# Patient Record
Sex: FEMALE | Race: WHITE | Employment: FULL TIME | URBAN - METROPOLITAN AREA
[De-identification: names, ages, dates, MRNs, and addresses within clinical notes are randomized per-mention and may not be internally consistent; named-entity substitution may affect disease eponyms.]

---

## 2017-03-13 ENCOUNTER — OFFICE VISIT - HEALTHEAST (OUTPATIENT)
Dept: FAMILY MEDICINE | Facility: CLINIC | Age: 57
End: 2017-03-13

## 2017-03-13 ENCOUNTER — COMMUNICATION - HEALTHEAST (OUTPATIENT)
Dept: FAMILY MEDICINE | Facility: CLINIC | Age: 57
End: 2017-03-13

## 2017-03-13 DIAGNOSIS — J45.901 ASTHMA EXACERBATION: ICD-10-CM

## 2017-03-13 DIAGNOSIS — J01.90 ACUTE SINUSITIS: ICD-10-CM

## 2017-08-19 ENCOUNTER — HEALTH MAINTENANCE LETTER (OUTPATIENT)
Age: 57
End: 2017-08-19

## 2018-01-07 ENCOUNTER — HEALTH MAINTENANCE LETTER (OUTPATIENT)
Age: 58
End: 2018-01-07

## 2019-11-06 ENCOUNTER — HEALTH MAINTENANCE LETTER (OUTPATIENT)
Age: 59
End: 2019-11-06

## 2020-03-14 ENCOUNTER — NURSE TRIAGE (OUTPATIENT)
Dept: NURSING | Facility: CLINIC | Age: 60
End: 2020-03-14

## 2020-03-15 ENCOUNTER — VIRTUAL VISIT (OUTPATIENT)
Dept: FAMILY MEDICINE | Facility: OTHER | Age: 60
End: 2020-03-15

## 2020-03-15 NOTE — TELEPHONE ENCOUNTER
4 days ago - fever Wednesday night, woke up with chills/cold sweat, dry cough on Monday. Thursday morning 101.4. Has shortness of breath which is different from her typical asthma. Feels short of breath when speaking. Intense headache Temp earlier today was 103. Influenza test was negative 2 days ago. Per protocol, advised [email or web address] evaluation for possible covid testing.    Evita Mccartney RN on 3/14/2020 at 11:02 PM    Reason for Disposition    [1] Fever or feeling feverish AND [2] within 14 Days of CORONAVIRUS EXPOSURE    Additional Information    Negative: Severe difficulty breathing (e.g., struggling for each breath, speak in single words, bluish lips)    Negative: Sounds like a life-threatening emergency to the triager    Negative: [1] Difficulty breathing (shortness of breath) occurs AND [2] onset > 14 days after CORONAVIRUS EXPOSURE (Close Contact)    Negative: [1] Dry cough occurs AND [2] onset > 14 days after CORONAVIRUS EXPOSURE    Negative: [1] Wet cough (i.e., white-yellow, yellow, green, or kate colored sputum) AND [2] onset > 14 days after CORONAVIRUS EXPOSURE    Negative: [1] Common cold symptoms AND [2] onset > 14 days after CORONAVIRUS EXPOSURE    Negative: [1] Difficulty breathing occurs AND [2] within 14 days of CORONAVIRUS EXPOSURE    Negative: Patient sounds very sick or weak to the triager    [facility] Specific Disposition  - REQUIRED: [facility] Specific Patient Instructions  COVID 19 Nurse Triage Plan    Patient advised to stay home with mild symptoms and follow home care symptom management.     Instructions Given to Patient  It is recommended that you setup a virtual visit with one of our virtual providers.  To do this follow these instructions:    1. Go to the website [email or web address]  2. Create an account (you will need your insurance information)  3. Start a new visit  4. Choose your diagnosis (e.g. COVID19)  5. Fill out the information about your symptoms  6. A provider  will reach out to you by text, phone call or video visit based on your request    While you are at home please follow these instructions to care for yourself:    Isolate Yourself:  Isolate yourself at home.   Do Not allow any visitors  Do Not go to work or school  Do Not go to [employer],  centers, shopping, or other public places.  Do Not shake hands.  Avoid close contact with others (hugging, kissing).    Protect Others:  Cover Your Mouth and Nose with a mask, disposable tissue or wash cloth to avoid spreading germs to others.  Wash your hands and face frequently with soap and water.    Fever Medicines:  For fever relief, take acetaminophen or ibuprofen.  Treat fevers above 101  F (38.3  C) to lower fevers and make you more comfortable.   Acetaminophen (e.g., Tylenol): Take 650 mg (two 325 mg pills) by mouth every 4-6 hours as needed of regular strength Tyleno or 1,000 mg (two 500 mg pills) every 8 hours as needed of Extra Strength Tylenol.   Ibuprofen (e.g., Motrin, Advil): Take 400 mg (two 200 mg pills) by mouth every 6 hours as needed.   Acetaminophen is thought to be safer than ibuprofen or naproxen for people over 65 years old. Acetaminophen is in many OTC and prescription medicines. It might be in more than one medicine that you are taking. You need to be careful and not take an overdose. Before taking any medicine, read all the instructions on the package.  Caution -NSAIDs (e.g., ibuprofen, naproxen): Do not take nonsteroidal anti-inflammatory drugs (NSAIDs) if you have stomach problems, kidney disease, heart failure, or other contraindications to using this type of medicine. Do not take NSAID medicines for over 7 days without consulting your PCP. Do not take NSAID medicines if you are pregnant. Do not take NSAID medicines if you are also taking blood thinners.     Call Back If: Breathing difficulty develops or you become worse.    Thank you for limiting contact with others, wearing a simple mask to  cover your cough, practice good hand hygiene habits and accessing our virtual services where possible to limit the spread of this virus.    For more information about COVID19 and options for caring for yourself at home, please visit the CDC website at https://www.cdc.gov/coronavirus/2019-ncov/about/steps-when-sick.html  For more options for care at [facility], please visit our website at [email or web address]    Protocols used: CORONAVIRUS (2019-NCOV) EXPOSURE-A-AH

## 2020-03-15 NOTE — PROGRESS NOTES
"Date: 03/15/2020 00:00:19  Clinician: Lyric Christine  Clinician NPI: [id number]  Patient: Kari Gage  Patient : 1960  Patient Address: [address] [place] [id number]  Patient Phone: [phone]  Visit Protocol: URI  Patient Summary:  Kari is a 59 year old ( : 1960 ) female who initiated a Visit for COVID-19 (Coronavirus) evaluation and screening. When asked the question \"Please sign me up to receive news, health information and promotions. \", Kari responded \"No\".    Kari states her symptoms started gradually 3-6 days ago.   Her symptoms consist of malaise, myalgia, chills, a cough, and a headache. She is experiencing mild difficulty breathing with activities but can speak normally in full sentences. Kari also feels feverish.   Symptom details     Cough: Kari coughs a few times an hour and her cough is not more bothersome at night. Phlegm does not come into her throat when she coughs. She does not believe her cough is caused by post-nasal drip.     Temperature: Her current temperature is 100.8 degrees Fahrenheit. Kari has had a temperature over 100 degrees Fahrenheit for 3-4 days.     Headache: She states the headache is severe (7-9 on a 10 point pain scale).      Kari denies having ear pain, rhinitis, enlarged lymph nodes, facial pain or pressure, wheezing, sore throat, nasal congestion, and teeth pain. She also denies double sickening (worsening symptoms after initial improvement), taking antibiotic medication for the symptoms, having a sinus infection within the past year, and having recent facial or sinus surgery in the past 60 days.   Precipitating events  She has not recently been exposed to someone with influenza. Kari has been in close contact with the following high risk individuals: people with asthma, heart disease or diabetes, immunocompromised people, adults 65 or older, pregnant women, and children under the age of 5.   Pertinent COVID-19 (Coronavirus) information "  Kari has not traveled internationally or to the areas where COVID-19 (Coronavirus) is widespread in the last 14 days before the start of her symptoms.   Kari has not had close contact with a suspected or laboratory-confirmed COVID-19 patient within 14 days of symptom onset.   Kari is not a healthcare worker and does not work in a healthcare facility.   Pertinent medical history  Kari does not get yeast infections when she takes antibiotics.   Kari does not need a return to work/school note.   Weight: 158 lbs   Kari does not smoke or use smokeless tobacco.   Additional information as reported by the patient (free text): 3-9 cough started. 3-11, fever started over 101 &amp; chills. 3-12, fever still over 101 &amp; headache &amp; shortness of breath started through the day, &amp; is still. Went to a Dr. &amp; tested Negative to the usual flu &amp; they gave me an inhaler for my shortness of breath. I have intervals of where I have trouble breathing to talk &amp; sitting up. I was exposed to my sister who stayed at same hotel as someone that was in another country. I've had Cancer, Hashimoto's Disease, I'm 59 &amp; I have asthma. All high risks.   Weight: 158 lbs    MEDICATIONS: Ventolin HFA inhalation, ALLERGIES: Dilaudid  Clinician Response:  Dear Kari,    Diagnosis: Cough  Diagnosis ICD: R05  Additional Clinician Notes: I understand your concern regarding Covid-19. However you do not meet testing requirements at this time. If you are having trouble breathing you should go to the nearest ER. Testing guidelines set by the CDC and the [place] Department of Health are updated frequently so recommendations could change. Please continue to monitor your symptoms.

## 2020-11-29 ENCOUNTER — HEALTH MAINTENANCE LETTER (OUTPATIENT)
Age: 60
End: 2020-11-29

## 2021-05-26 ENCOUNTER — RECORDS - HEALTHEAST (OUTPATIENT)
Dept: ADMINISTRATIVE | Facility: CLINIC | Age: 61
End: 2021-05-26

## 2021-05-30 VITALS — BODY MASS INDEX: 24.31 KG/M2 | WEIGHT: 146.1 LBS

## 2021-06-01 ENCOUNTER — RECORDS - HEALTHEAST (OUTPATIENT)
Dept: ADMINISTRATIVE | Facility: CLINIC | Age: 61
End: 2021-06-01

## 2021-06-09 NOTE — PROGRESS NOTES
Subjective:      Patient ID: Kari Gage is a 56 y.o. female.    Chief Complaint:    HPI  Kari Gage is a 56 y.o. female who presents today complaining of cough and congestion for about two weeks.  She is also having shortness of breath, wheezing.  She does have a history of intermittent asthma but doesn't use an inhaler regularly and hers is .  She has had some sinus pain.  She did have pain in the left ear but that is improving.  She does still feel like there is fluid in the ears.  She did have a sore throat initially but that has resolved.  She did feel febrile initially but none for about 5 days.  She doesn't have a thermometer so wasn't able to check.  Her grandaughter had similar symptoms just prior to this patient's illness.  She did an on-line visit and was treated with Doxycycline which she finished yesterday.  She has been getting worse despite that medication.    Past Medical History:   Diagnosis Date     Non-Hodgkin lymphoma      Uterine cancer        Past Surgical History:   Procedure Laterality Date     APPENDECTOMY       CARPAL TUNNEL RELEASE      x 4     TEMPOROMANDIBULAR JOINT SURGERY       VAGINAL HYSTERECTOMY      1 ovary still intact; dx: uterine ca       No family history on file.    Social History   Substance Use Topics     Smoking status: Never Smoker     Smokeless tobacco: Never Used     Alcohol use None       Review of Systems    Objective:     Visit Vitals     /70     Pulse 61     Temp 98.2  F (36.8  C) (Oral)     Resp 14     Wt 146 lb 1.6 oz (66.3 kg)     SpO2 97%       Physical Exam   Constitutional: She appears well-developed and well-nourished. No distress.   HENT:   Right Ear: Tympanic membrane and ear canal normal.   Left Ear: Tympanic membrane and ear canal normal.   Nose: Right sinus exhibits maxillary sinus tenderness and frontal sinus tenderness. Left sinus exhibits no maxillary sinus tenderness and no frontal sinus tenderness.   Mouth/Throat: No  oropharyngeal exudate, posterior oropharyngeal edema, posterior oropharyngeal erythema or tonsillar abscesses.   Eyes: Conjunctivae are normal.   Neck: Normal range of motion. Neck supple.   Cardiovascular: Normal rate and regular rhythm.    No murmur heard.  Pulmonary/Chest: Effort normal and breath sounds normal. No respiratory distress. She has no wheezes. She has no rales.   Lymphadenopathy:     She has no cervical adenopathy.   Skin: No rash noted.     Procedures      Assessment / Plan:     1. Acute sinusitis  cefuroxime (CEFTIN) 250 MG tablet   2. Asthma exacerbation  albuterol (PROVENTIL HFA;VENTOLIN HFA) 90 mcg/actuation inhaler    inhalational spacing device Spcr         Patient Instructions   1) Ceftin 250 mg twice daily for 10 days.  2) Albuterol with spacer 2 puffs every 4 hours as needed for wheezing.  3) Follow up in 7-10 days if not improving, sooner if worsening or other concerns.

## 2021-09-19 ENCOUNTER — HEALTH MAINTENANCE LETTER (OUTPATIENT)
Age: 61
End: 2021-09-19

## 2021-11-14 ENCOUNTER — HEALTH MAINTENANCE LETTER (OUTPATIENT)
Age: 61
End: 2021-11-14

## 2022-01-09 ENCOUNTER — HEALTH MAINTENANCE LETTER (OUTPATIENT)
Age: 62
End: 2022-01-09

## 2023-06-02 ENCOUNTER — HEALTH MAINTENANCE LETTER (OUTPATIENT)
Age: 63
End: 2023-06-02

## 2023-11-26 ENCOUNTER — HEALTH MAINTENANCE LETTER (OUTPATIENT)
Age: 63
End: 2023-11-26

## 2024-02-04 ENCOUNTER — HEALTH MAINTENANCE LETTER (OUTPATIENT)
Age: 64
End: 2024-02-04